# Patient Record
Sex: MALE | URBAN - METROPOLITAN AREA
[De-identification: names, ages, dates, MRNs, and addresses within clinical notes are randomized per-mention and may not be internally consistent; named-entity substitution may affect disease eponyms.]

---

## 2020-03-28 ENCOUNTER — EMERGENCY (EMERGENCY)
Facility: HOSPITAL | Age: 70
LOS: 1 days | Discharge: HOME | End: 2020-03-28
Attending: STUDENT IN AN ORGANIZED HEALTH CARE EDUCATION/TRAINING PROGRAM
Payer: SELF-PAY

## 2020-03-28 VITALS
HEART RATE: 122 BPM | TEMPERATURE: 99 F | WEIGHT: 179.9 LBS | HEIGHT: 64 IN | SYSTOLIC BLOOD PRESSURE: 120 MMHG | DIASTOLIC BLOOD PRESSURE: 74 MMHG | OXYGEN SATURATION: 95 % | RESPIRATION RATE: 20 BRPM

## 2020-03-28 VITALS
SYSTOLIC BLOOD PRESSURE: 108 MMHG | DIASTOLIC BLOOD PRESSURE: 69 MMHG | OXYGEN SATURATION: 95 % | TEMPERATURE: 99 F | HEART RATE: 97 BPM | RESPIRATION RATE: 20 BRPM

## 2020-03-28 LAB
APPEARANCE UR: ABNORMAL
BACTERIA # UR AUTO: NEGATIVE — SIGNIFICANT CHANGE UP
BILIRUB UR-MCNC: NEGATIVE — SIGNIFICANT CHANGE UP
COLOR SPEC: YELLOW — SIGNIFICANT CHANGE UP
DIFF PNL FLD: ABNORMAL
EPI CELLS # UR: 5 /HPF — SIGNIFICANT CHANGE UP
GLUCOSE UR QL: NEGATIVE — SIGNIFICANT CHANGE UP
HYALINE CASTS # UR AUTO: 0 /LPF — SIGNIFICANT CHANGE UP (ref 0–7)
KETONES UR-MCNC: SIGNIFICANT CHANGE UP
LEUKOCYTE ESTERASE UR-ACNC: NEGATIVE — SIGNIFICANT CHANGE UP
NITRITE UR-MCNC: NEGATIVE — SIGNIFICANT CHANGE UP
PH UR: 6 — SIGNIFICANT CHANGE UP (ref 5–8)
PROT UR-MCNC: ABNORMAL
RBC CASTS # UR COMP ASSIST: 3 /HPF — SIGNIFICANT CHANGE UP (ref 0–4)
SP GR SPEC: 1.04 — HIGH (ref 1.01–1.02)
UROBILINOGEN FLD QL: NEGATIVE — SIGNIFICANT CHANGE UP
WBC UR QL: 8 /HPF — HIGH (ref 0–5)

## 2020-03-28 PROCEDURE — 81001 URINALYSIS AUTO W/SCOPE: CPT

## 2020-03-28 PROCEDURE — 99284 EMERGENCY DEPT VISIT MOD MDM: CPT

## 2020-03-28 PROCEDURE — 71045 X-RAY EXAM CHEST 1 VIEW: CPT | Mod: 26

## 2020-03-28 PROCEDURE — 71045 X-RAY EXAM CHEST 1 VIEW: CPT

## 2020-03-28 PROCEDURE — 99053 MED SERV 10PM-8AM 24 HR FAC: CPT

## 2020-03-28 PROCEDURE — 99284 EMERGENCY DEPT VISIT MOD MDM: CPT | Mod: 25

## 2020-03-28 RX ORDER — AZITHROMYCIN 500 MG/1
250 TABLET, FILM COATED ORAL
Qty: 1000 | Refills: 0
Start: 2020-03-28 | End: 2020-03-31

## 2020-03-28 RX ORDER — ACETAMINOPHEN 500 MG
975 TABLET ORAL ONCE
Refills: 0 | Status: COMPLETED | OUTPATIENT
Start: 2020-03-28 | End: 2020-03-28

## 2020-03-28 RX ORDER — AZITHROMYCIN 500 MG/1
500 TABLET, FILM COATED ORAL ONCE
Refills: 0 | Status: COMPLETED | OUTPATIENT
Start: 2020-03-28 | End: 2020-03-28

## 2020-03-28 RX ADMIN — Medication 975 MILLIGRAM(S): at 14:08

## 2020-03-28 RX ADMIN — AZITHROMYCIN 500 MILLIGRAM(S): 500 TABLET, FILM COATED ORAL at 14:04

## 2020-03-28 RX ADMIN — Medication 975 MILLIGRAM(S): at 10:47

## 2020-03-28 NOTE — ED PROVIDER NOTE - ATTENDING CONTRIBUTION TO CARE
Attending MD Cyr:   I personally have seen and examined this patient.  ACP, Resident, medical student note reviewed and agree on plan of care and except where noted.     69y M PMH HTN, HLD presents with fever, chills, body aches, mild cough x 10 days, s/p azithro and tamiflu.    On exam patient is well appearing, tachy regular s1s2, lungs with bibasilar crackles, satting 95% on RA, abdomen soft, A+O x 3.     Concern for COVID vs other URI, PNA. Will obtain cxr, likely dc home.

## 2020-03-28 NOTE — ED PROVIDER NOTE - OBJECTIVE STATEMENT
69M PMH HTN, HLD p/w fever, chills, body aches x 10d. Associated with mild cough. S/p course of Azithro and partial course of Tamiflu prescribed by PCP via telephone call. Reports to ER today for persistent fevers (Tmax 102.7). 1 episode of abdominal discomfort 10d ago, now resolved. No NVD, dysuria.

## 2020-03-28 NOTE — ED PROVIDER NOTE - PATIENT PORTAL LINK FT
You can access the FollowMyHealth Patient Portal offered by [facility] by registering at the following website: [email or web address]. By joining [employer]’s FollowMyHealth portal, you will also be able to view your health information using other applications (apps) compatible with our system.

## 2020-03-28 NOTE — ED PROVIDER NOTE - CARE PLAN
Principal Discharge DX:	Suspected [facility] coronavirus infection  Secondary Diagnosis:	Pneumonia

## 2020-03-28 NOTE — ED PROVIDER NOTE - PHYSICAL EXAMINATION
I have reviewed the triage vital signs.  Const: AAOx3, in NAD  Eyes: no conjunctival injection  HENT: NCAT, Neck supple, oral mucosa moist  CV: RRR, +S1, S2  Resp: CTAB, no respiratory distress, O2 Sat 95% on RA at rest and with ambulation  GI: Abdomen soft, NTND, no guarding, no CVA tenderness  Extremities: No peripheral edema,  2+ radial and DP pulses  Skin: Warm, well perfused, no rash  MSK: No gross deformities appreciated  Neuro: No focal sensory or motor deficits  Psych: Appropriate mood and affect

## 2020-03-28 NOTE — ED PROVIDER NOTE - CLINICAL SUMMARY MEDICAL DECISION MAKING FREE TEXT BOX
Nii, PGY1 - 69M PMH HTN, HLD p/w fever and chills x 10d. S/p Azithro, Tamiflu (partially). No respiratory distress. O2 Sat 95% on RA at rest and with ambulation. DDx includes COVID, PNA, low suspicion for UTI. Plan: CXR, UA.

## 2020-03-28 NOTE — ED PROVIDER NOTE - NS ED ROS FT
General: +fevers or chills  Eyes: Denies vision changes  ENMT: Denies trouble swallowing or speaking, or sore throat  CV: Denies chest pain or palpitations  Resp: +Mild cough. Denies SOB  GI: +Abdominal discomfort. No nausea, vomiting, diarrhea, or constipation   : Denies painful urination, increased urinary frequency, or blood in urine  Skin: Denies new rashes  Neuro: Denies headache, numbness, or weakness  MSK: Body aches

## 2020-03-28 NOTE — ED PROVIDER NOTE - NSFOLLOWUPINSTRUCTIONS_ED_ALL_ED_FT
A prescription for 2 antibiotics (Augmentin, Azithromycin) was sent to your pharmacy. Take as directed and complete the entire course. You are being treated for a pneumonia.     You most likely have the novel Coronavirus (COVID-19) as well.    -Rest and stay well hydrated  -Take over the counter acetaminophen (Tylenol) 650-1000 mg every every 4-6 hours as needed for fever/pain. Do not take more than 4000 mg in a 24 hour period. Be aware many over the counter and prescription medications also contain acetaminophen (Tylenol).    For a 14 day period:  -Stay inside your home as much as possible, avoiding public places or public interaction  -Do not go to work  -If you do enter any public domain, at minimum wear a surgical mask at all times  -Even while indoors, attempt to remain isolated from other individuals such as family or friends, as much as possible  -Return to the Emergency Department for any symptoms such as worsening shortness of breath, significant worsening cough, high fevers despite over the counter fever-reducing medications, or severe weakness/malaise